# Patient Record
Sex: MALE | Race: BLACK OR AFRICAN AMERICAN | NOT HISPANIC OR LATINO | Employment: OTHER | URBAN - METROPOLITAN AREA
[De-identification: names, ages, dates, MRNs, and addresses within clinical notes are randomized per-mention and may not be internally consistent; named-entity substitution may affect disease eponyms.]

---

## 2021-01-07 ENCOUNTER — TELEPHONE (OUTPATIENT)
Dept: CARDIOLOGY | Facility: CLINIC | Age: 66
End: 2021-01-07

## 2021-02-01 ENCOUNTER — TELEPHONE (OUTPATIENT)
Dept: CARDIOLOGY | Facility: CLINIC | Age: 66
End: 2021-02-01

## 2021-10-11 RX ORDER — SOTALOL HYDROCHLORIDE 80 MG/1
TABLET ORAL
Qty: 135 TABLET | Refills: 1 | Status: SHIPPED | OUTPATIENT
Start: 2021-10-11 | End: 2022-11-03 | Stop reason: SDUPTHER

## 2021-10-29 ENCOUNTER — OFFICE VISIT (OUTPATIENT)
Dept: CARDIOLOGY | Facility: CLINIC | Age: 66
End: 2021-10-29
Payer: MEDICARE

## 2021-10-29 VITALS
SYSTOLIC BLOOD PRESSURE: 104 MMHG | HEART RATE: 60 BPM | DIASTOLIC BLOOD PRESSURE: 60 MMHG | WEIGHT: 134 LBS | RESPIRATION RATE: 16 BRPM | HEIGHT: 67 IN | OXYGEN SATURATION: 98 % | BODY MASS INDEX: 21.03 KG/M2

## 2021-10-29 DIAGNOSIS — I25.82 CHRONIC TOTAL OCCLUSION OF NATIVE CORONARY ARTERY: ICD-10-CM

## 2021-10-29 DIAGNOSIS — I61.9 HEMORRHAGIC CEREBROVASCULAR ACCIDENT (CVA): ICD-10-CM

## 2021-10-29 DIAGNOSIS — F17.200 CURRENT SMOKER: ICD-10-CM

## 2021-10-29 DIAGNOSIS — I10 ESSENTIAL HYPERTENSION: ICD-10-CM

## 2021-10-29 DIAGNOSIS — E78.2 MIXED HYPERLIPIDEMIA: ICD-10-CM

## 2021-10-29 DIAGNOSIS — I25.10 CHRONIC TOTAL OCCLUSION OF NATIVE CORONARY ARTERY: ICD-10-CM

## 2021-10-29 DIAGNOSIS — R94.31 NONSPECIFIC ST-T WAVE ELECTROCARDIOGRAPHIC CHANGES: Primary | ICD-10-CM

## 2021-10-29 PROCEDURE — 99214 OFFICE O/P EST MOD 30 MIN: CPT | Mod: S$GLB,,, | Performed by: INTERNAL MEDICINE

## 2021-10-29 PROCEDURE — 93000 EKG 12-LEAD: ICD-10-PCS | Mod: S$GLB,,, | Performed by: INTERNAL MEDICINE

## 2021-10-29 PROCEDURE — 99214 PR OFFICE/OUTPT VISIT, EST, LEVL IV, 30-39 MIN: ICD-10-PCS | Mod: S$GLB,,, | Performed by: INTERNAL MEDICINE

## 2021-10-29 PROCEDURE — 93000 ELECTROCARDIOGRAM COMPLETE: CPT | Mod: S$GLB,,, | Performed by: INTERNAL MEDICINE

## 2021-10-29 RX ORDER — AMLODIPINE BESYLATE 5 MG/1
5 TABLET ORAL 2 TIMES DAILY
COMMUNITY
Start: 2021-10-05 | End: 2022-05-31 | Stop reason: SDUPTHER

## 2021-10-29 RX ORDER — FLUTICASONE PROPIONATE 50 MCG
1 SPRAY, SUSPENSION (ML) NASAL DAILY
COMMUNITY
Start: 2021-07-01

## 2021-10-29 RX ORDER — FAMOTIDINE 20 MG/1
20 TABLET, FILM COATED ORAL 2 TIMES DAILY
COMMUNITY
Start: 2021-07-01 | End: 2023-01-20

## 2021-10-29 RX ORDER — ALBUTEROL SULFATE 90 UG/1
AEROSOL, METERED RESPIRATORY (INHALATION)
COMMUNITY
Start: 2021-10-23

## 2022-05-31 RX ORDER — AMLODIPINE BESYLATE 5 MG/1
5 TABLET ORAL 2 TIMES DAILY
Qty: 90 TABLET | Refills: 3 | Status: SHIPPED | OUTPATIENT
Start: 2022-05-31 | End: 2023-04-21 | Stop reason: SDUPTHER

## 2022-05-31 NOTE — TELEPHONE ENCOUNTER
----- Message from Ananda Moore sent at 5/31/2022  1:40 PM CDT -----  Type:  RX Refill Request  Who Called: Patient  Refill or New Rx: refill  RX Name and Strength: amLODIPine (NORVASC) 5 MG tablet  How is the patient currently taking it? (ex. 1XDay):    Is this a 30 day or 90 day RX:    Preferred Pharmacy with phone number:  [facility], [place] - [address] [facility]   Phone:  [phone]  Fax:  [phone]  Local or Mail Order:  Local  Ordering Provider:    Best Call Back Number:  [phone]  Additional Information: Pt requesting refill on Rx amLODIPine (NORVASC) 5 MG tablet

## 2022-11-03 RX ORDER — SOTALOL HYDROCHLORIDE 80 MG/1
TABLET ORAL
Qty: 135 TABLET | Refills: 1 | Status: SHIPPED | OUTPATIENT
Start: 2022-11-03 | End: 2022-11-04 | Stop reason: SDUPTHER

## 2022-11-04 RX ORDER — SOTALOL HYDROCHLORIDE 80 MG/1
TABLET ORAL
Qty: 135 TABLET | Refills: 1 | Status: SHIPPED | OUTPATIENT
Start: 2022-11-04 | End: 2024-02-08

## 2022-11-04 NOTE — TELEPHONE ENCOUNTER
----- Message from Alireza Byrnes sent at 11/4/2022 10:21 AM CDT -----  Type:  RX Refill Request    Who Called:  pt  Refill or New Rx:  refill  RX Name and Strength:  sotaloL (BETAPACE) 80 MG tablet  How is the patient currently taking it? (ex. 1XDay):  as directed  Is this a 30 day or 90 day RX:  90  Preferred Pharmacy with phone number:    [facility], [place] - [id number] [facility] [place] [address]  Phone: [phone] Fax: [phone]      Local or Mail Order:  local  Ordering Provider:  Fabio Smith Call Back Number:  [phone]    Additional Information:  please call and advise--thank you

## 2022-12-27 ENCOUNTER — TELEPHONE (OUTPATIENT)
Dept: CARDIOLOGY | Facility: CLINIC | Age: 67
End: 2022-12-27
Payer: COMMERCIAL

## 2023-01-06 ENCOUNTER — TELEPHONE (OUTPATIENT)
Dept: CARDIOLOGY | Facility: CLINIC | Age: 68
End: 2023-01-06
Payer: COMMERCIAL

## 2023-01-06 NOTE — TELEPHONE ENCOUNTER
----- Message from Alireza Byrnes sent at 1/6/2023  8:48 AM CST -----  Type: Needs Medical Advice  Who Called:  pt sister, Luciana  Symptoms (please be specific):  said she wanted to know if she can get her brother appt the same time as her--please call her--please call and advise  Best Call Back Number: [phone]  Additional Information: thank you

## 2023-01-10 ENCOUNTER — TELEPHONE (OUTPATIENT)
Dept: CARDIOLOGY | Facility: CLINIC | Age: 68
End: 2023-01-10
Payer: COMMERCIAL

## 2023-01-10 NOTE — TELEPHONE ENCOUNTER
----- Message from Ananda Moore sent at 1/10/2023  8:16 AM CST -----  Contact: Luciana  Type: Needs Medical Advice  Who Called: Pt sister Luciana  Symptoms (please be specific):   How long has patient had these symptoms:    Pharmacy name and phone #:    Best Call Back Number: [phone]  Additional Information: Pt sister requesting a call back to see if pt can be moved back to his original appt date which was 01/13/23.Attempt to r/s but date not available.

## 2023-01-20 ENCOUNTER — OFFICE VISIT (OUTPATIENT)
Dept: CARDIOLOGY | Facility: CLINIC | Age: 68
End: 2023-01-20
Payer: MEDICARE

## 2023-01-20 VITALS
BODY MASS INDEX: 21.03 KG/M2 | SYSTOLIC BLOOD PRESSURE: 132 MMHG | OXYGEN SATURATION: 98 % | DIASTOLIC BLOOD PRESSURE: 68 MMHG | WEIGHT: 134 LBS | RESPIRATION RATE: 16 BRPM | HEART RATE: 72 BPM | HEIGHT: 67 IN

## 2023-01-20 DIAGNOSIS — I25.82 CHRONIC TOTAL OCCLUSION OF NATIVE CORONARY ARTERY: ICD-10-CM

## 2023-01-20 DIAGNOSIS — E78.2 MIXED HYPERLIPIDEMIA: ICD-10-CM

## 2023-01-20 DIAGNOSIS — I61.9 HEMORRHAGIC CEREBROVASCULAR ACCIDENT (CVA): ICD-10-CM

## 2023-01-20 DIAGNOSIS — R94.31 NONSPECIFIC ST-T WAVE ELECTROCARDIOGRAPHIC CHANGES: ICD-10-CM

## 2023-01-20 DIAGNOSIS — F17.200 CURRENT SMOKER: ICD-10-CM

## 2023-01-20 DIAGNOSIS — I10 ESSENTIAL HYPERTENSION: Primary | ICD-10-CM

## 2023-01-20 DIAGNOSIS — I25.10 CHRONIC TOTAL OCCLUSION OF NATIVE CORONARY ARTERY: ICD-10-CM

## 2023-01-20 PROCEDURE — 93000 ELECTROCARDIOGRAM COMPLETE: CPT | Mod: S$GLB,,, | Performed by: INTERNAL MEDICINE

## 2023-01-20 PROCEDURE — 99214 OFFICE O/P EST MOD 30 MIN: CPT | Mod: S$GLB,,, | Performed by: INTERNAL MEDICINE

## 2023-01-20 PROCEDURE — 99214 PR OFFICE/OUTPT VISIT, EST, LEVL IV, 30-39 MIN: ICD-10-PCS | Mod: S$GLB,,, | Performed by: INTERNAL MEDICINE

## 2023-01-20 PROCEDURE — 93000 EKG 12-LEAD: ICD-10-PCS | Mod: S$GLB,,, | Performed by: INTERNAL MEDICINE

## 2023-01-20 RX ORDER — BUDESONIDE AND FORMOTEROL FUMARATE DIHYDRATE 160; 4.5 UG/1; UG/1
AEROSOL RESPIRATORY (INHALATION)
COMMUNITY
Start: 2023-01-09

## 2023-01-20 NOTE — PROGRESS NOTES
Subjective:    Patient ID:  Gonzalo Castrejon is a 67 y.o. male     Chief Complaint   Patient presents with    Shortness of Breath    Hypertension    Tachycardia       HPI:  Mr Gonzalo Castrejon is a 67 y.o. male is here for follow-up.    Patient has been doing well no specific complaints at the present time.  His breathing is stable at the present time he had a bout of coughing and wheezing and severe shortness of breath and difficulty in breathing which made him to stop smoking completely.  He completely give up smoking and he feels much better.  He still has some bronchitis and brings up sputum occasionally.    Denies any chest pain or tightness or heaviness denies any dizziness or lightheadedness denies any loss of consciousness falls or head injury.    He is taking all his medications regularly.        Review of patient's allergies indicates:  No Known Allergies    History reviewed. No pertinent past medical history.  History reviewed. No pertinent surgical history.  Social History     Tobacco Use    Smoking status: Every Day    Smokeless tobacco: Never   Substance Use Topics    Alcohol use: Not Currently     History reviewed. No pertinent family history.     Review of Systems:   Constitution: Negative for diaphoresis and fever.   HEENT: Negative for nosebleeds.    Cardiovascular: Negative for chest pain       Intermittent dyspnea on exertion       No leg swelling        No palpitations  Respiratory:  Positive for shortness of breath and wheezing.    Hematologic/Lymphatic: Negative for bleeding problem. Does not bruise/bleed easily.   Skin: Negative for color change and rash.   Musculoskeletal: Negative for falls and myalgias.   Gastrointestinal: Negative for hematemesis and hematochezia.   Genitourinary: Negative for hematuria.   Neurological: Negative for dizziness and light-headedness.   Psychiatric/Behavioral: Negative for altered mental status and memory loss.          Objective:        Vitals:    01/20/23 0849    BP: 132/68   Pulse: 72   Resp: 16       No results found for: WBC, HGB, HCT, PLT, CHOL, TRIG, HDL, LDLDIRECT, ALT, AST, NA, K, CL, CREATININE, BUN, CO2, TSH, PSA, INR, GLUF, HGBA1C, MICROALBUR     ECHOCARDIOGRAM RESULTS  Results for orders placed during the hospital encounter of 22    Echo    Interpretation Summary  · The left ventricle is normal in size with normal systolic function.  · The estimated ejection fraction is 60%.  · Normal left ventricular diastolic function.  · Normal right ventricular size with normal right ventricular systolic function.        CURRENT/PREVIOUS VISIT EKG  Results for orders placed or performed in visit on 10/29/21   IN OFFICE EKG 12-LEAD (to [facility])    Collection Time: 10/29/21  2:23 PM    Narrative    Test Reason : R94.31,    Vent. Rate : 060 BPM     Atrial Rate : 060 BPM     P-R Int : 164 ms          QRS Dur : 098 ms      QT Int : 432 ms       P-R-T Axes : 073 062 -43 degrees     QTc Int : 432 ms    Normal sinus rhythm  Moderate voltage criteria for LVH, may be normal variant  Cannot rule out Inferior infarct ,age undetermined  Abnormal ECG  No previous ECGs available  Confirmed by Fabio Kaur MD ([id number]) on 2021 12:50:41 PM    Referred By:             Confirmed By:Fabio Kaur MD     No valid procedures specified.   No results found for this or any previous visit.      Physical Exam:  CONSTITUTIONAL: No fever, no chills  HEENT: Normocephalic, atraumatic,pupils reactive to light                 NECK:  No JVD no carotid bruit  CVS: S1S2+, RRR, systolic murmurs,   LUNGS: Clear bilateral decreased breath sounds at the bases  ABDOMEN: Soft, NT, BS+  EXTREMITIES: No cyanosis, edema  : No abel catheter  NEURO: AAO X 3  PSY: Normal affect      Medication List with Changes/Refills   Current Medications    ALBUTEROL (PROVENTIL/VENTOLIN HFA) 90 MCG/ACTUATION INHALER    SMARTSI Inhalation Via Inhaler Every 4 Hours PRN    AMLODIPINE (NORVASC) 5 MG TABLET    Take 1 tablet (5  mg total) by mouth 2 (two) times daily.    BUDESONIDE-FORMOTEROL 160-4.5 MCG (SYMBICORT) 160-4.5 MCG/ACTUATION HFAA    Inhale into the lungs.    FLUTICASONE PROPIONATE (FLONASE) 50 MCG/ACTUATION NASAL SPRAY    1 spray by Each Nostril route once daily.    SOTALOL (BETAPACE) 80 MG TABLET    TAKE 1/2 TABLET 3 TIMES A DAY AS DIRECTED   Discontinued Medications    FAMOTIDINE (PEPCID) 20 MG TABLET    Take 20 mg by mouth 2 (two) times daily.             Assessment:       1. Essential hypertension    2. Hemorrhagic cerebrovascular accident (CVA)    3. Chronic total occlusion of native coronary artery    4. Mixed hyperlipidemia    5. Current smoker    6. Nonspecific ST-T wave electrocardiographic changes         Plan:   1. Essential hypertension  Patient's blood pressure is stable at 132/68 and he is taking his medications he is on amlodipine 5 mg p.o. b.i.d. continue the same.  2. Patient has chronic obstructive pulmonary disease  He is currently on albuterol and Symbicort it takes it on regular basis.  And the inhaler is working well for him.    3. 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  4. Paroxysmal atrial fibrillation   Is on sotalol 80 mg p.o. b.i.d. continue the same.  His QT intervals are QT is 408 and corrected QT interval is 446 milliseconds.  5. Hemorrhagic cerebrovascular accident   Patient is not on any specific medications or anticoagulants due to his CVA.    6. EKG  Reviewed his EKG independently patient is in normal sinus rhythm with a heart rate of 72 beats per minute minimal voltage criteria for left ventricle hypertrophy in nonspecific ST T-wave changes.  Can not rule out inferior infarct age undetermined.  No significant change from the previous EKG.    7. Patient to continue current management.  Patient had echocardiogram done on 03/03/2022 and it showed normal LV function with left ventricle ejection fraction of 60% normal diastolic function normal right ventricle  systolic function.     8. I will see him back in the office in 6 months time.            Problem List Items Addressed This Visit          Neuro    Hemorrhagic cerebrovascular accident (CVA)       Cardiac/Vascular    Mixed hyperlipidemia    Essential hypertension - Primary    Chronic total occlusion of native coronary artery    Nonspecific ST-T wave electrocardiographic changes       Other    Current smoker       No follow-ups on file.

## 2023-04-21 RX ORDER — AMLODIPINE BESYLATE 5 MG/1
5 TABLET ORAL 2 TIMES DAILY
Qty: 90 TABLET | Refills: 3 | Status: SHIPPED | OUTPATIENT
Start: 2023-04-21 | End: 2023-11-13

## 2023-04-21 NOTE — TELEPHONE ENCOUNTER
----- Message from Ayanna Cara sent at 4/21/2023  9:56 AM CDT -----  Contact: PT  Type:  RX Refill Request    Who Called:  Gonzalo/ PT  Refill or New Rx: refill  RX Name and Strength:  amLODIPine (NORVASC) 5 MG tablet  How is the patient currently taking it? Take 1 tablet (5 mg total) by mouth 2 (two) times daily  Is this a 30 day or 90 day RX: 90  Preferred Pharmacy with phone number:    [place], [place] - [address] [address]  Phone: [phone] Fax: [phone]    Best Call Back Number:  [phone]  Additional Information: PT out of Medication,  asking to please send Rx to pharmacy. Would like to  prescription

## 2023-11-16 ENCOUNTER — OFFICE VISIT (OUTPATIENT)
Dept: CARDIOLOGY | Facility: CLINIC | Age: 68
End: 2023-11-16
Payer: MEDICARE

## 2023-11-16 VITALS
SYSTOLIC BLOOD PRESSURE: 128 MMHG | WEIGHT: 147 LBS | OXYGEN SATURATION: 98 % | HEIGHT: 67 IN | DIASTOLIC BLOOD PRESSURE: 80 MMHG | RESPIRATION RATE: 16 BRPM | BODY MASS INDEX: 23.07 KG/M2

## 2023-11-16 DIAGNOSIS — I25.82 CHRONIC TOTAL OCCLUSION OF NATIVE CORONARY ARTERY: ICD-10-CM

## 2023-11-16 DIAGNOSIS — I61.9 HEMORRHAGIC CEREBROVASCULAR ACCIDENT (CVA): Primary | ICD-10-CM

## 2023-11-16 DIAGNOSIS — I10 ESSENTIAL HYPERTENSION: ICD-10-CM

## 2023-11-16 DIAGNOSIS — I48.0 PAROXYSMAL ATRIAL FIBRILLATION: ICD-10-CM

## 2023-11-16 DIAGNOSIS — E78.2 MIXED HYPERLIPIDEMIA: ICD-10-CM

## 2023-11-16 DIAGNOSIS — F17.200 CURRENT SMOKER: ICD-10-CM

## 2023-11-16 DIAGNOSIS — R94.31 NONSPECIFIC ST-T WAVE ELECTROCARDIOGRAPHIC CHANGES: ICD-10-CM

## 2023-11-16 DIAGNOSIS — I25.10 CHRONIC TOTAL OCCLUSION OF NATIVE CORONARY ARTERY: ICD-10-CM

## 2023-11-16 PROCEDURE — 93010 EKG 12-LEAD: ICD-10-PCS | Mod: S$PBB,,, | Performed by: INTERNAL MEDICINE

## 2023-11-16 PROCEDURE — 99213 OFFICE O/P EST LOW 20 MIN: CPT | Mod: PBBFAC,PN | Performed by: INTERNAL MEDICINE

## 2023-11-16 PROCEDURE — 99214 PR OFFICE/OUTPT VISIT, EST, LEVL IV, 30-39 MIN: ICD-10-PCS | Mod: S$PBB,,, | Performed by: INTERNAL MEDICINE

## 2023-11-16 PROCEDURE — 93005 ELECTROCARDIOGRAM TRACING: CPT | Mod: PBBFAC,PN | Performed by: INTERNAL MEDICINE

## 2023-11-16 PROCEDURE — 93010 ELECTROCARDIOGRAM REPORT: CPT | Mod: S$PBB,,, | Performed by: INTERNAL MEDICINE

## 2023-11-16 PROCEDURE — 99214 OFFICE O/P EST MOD 30 MIN: CPT | Mod: S$PBB,,, | Performed by: INTERNAL MEDICINE

## 2023-11-16 PROCEDURE — 99999 PR PBB SHADOW E&M-EST. PATIENT-LVL III: ICD-10-PCS | Mod: PBBFAC,,, | Performed by: INTERNAL MEDICINE

## 2023-11-16 PROCEDURE — 99999 PR PBB SHADOW E&M-EST. PATIENT-LVL III: CPT | Mod: PBBFAC,,, | Performed by: INTERNAL MEDICINE

## 2023-11-16 NOTE — PROGRESS NOTES
Subjective:    Patient ID:  Gonzalo Castrejon is a 67 y.o. male     Chief Complaint   Patient presents with    Hyperlipidemia    Hypertension       HPI:  Mr Gonzalo Castrejon is a 67 y.o. male is here for follow-up.    Patient has been doing well no specific complaints at the present time.  His breathing has been good denies any shortness a breath or difficulty in breathing denies any chest pain or tightness or heaviness denies any dizziness or lightheadedness or loss of consciousness.    He is taking all his medications regularly.    Patient is very active in spite of having a stroke he keeps walking in the hallways.      Review of patient's allergies indicates:  No Known Allergies    History reviewed. No pertinent past medical history.  History reviewed. No pertinent surgical history.  Social History     Tobacco Use    Smoking status: Every Day    Smokeless tobacco: Never   Substance Use Topics    Alcohol use: Not Currently     History reviewed. No pertinent family history.     Review of Systems:   Constitution: Negative for diaphoresis and fever.   HEENT: Negative for nosebleeds.    Cardiovascular: Negative for chest pain       No dyspnea on exertion       No leg swelling        No palpitations  Respiratory: Negative for shortness of breath and wheezing.    Hematologic/Lymphatic: Negative for bleeding problem. Does not bruise/bleed easily.   Skin: Negative for color change and rash.   Musculoskeletal: Negative for falls and myalgias.   Gastrointestinal: Negative for hematemesis and hematochezia.   Genitourinary: Negative for hematuria.   Neurological: Negative for dizziness and light-headedness.   Psychiatric/Behavioral: Negative for altered mental status and memory loss.          Objective:        Vitals:    11/16/23 1357   BP: 128/80   Pulse: (P) 64   Resp: 16       Lab Results   Component Value Date    CHOL 175 08/09/2023    TRIG 86 08/09/2023    HDL 46 08/09/2023        ECHOCARDIOGRAM RESULTS  Results for orders  placed during the hospital encounter of 22    Echo    Interpretation Summary  · The left ventricle is normal in size with normal systolic function.  · The estimated ejection fraction is 60%.  · Normal left ventricular diastolic function.  · Normal right ventricular size with normal right ventricular systolic function.        CURRENT/PREVIOUS VISIT EKG  Results for orders placed or performed in visit on 23   IN OFFICE EKG 12-LEAD (to [place])    Collection Time: 23  8:48 AM    Narrative    Test Reason : R94.31,    Vent. Rate : 072 BPM     Atrial Rate : 072 BPM     P-R Int : 162 ms          QRS Dur : 100 ms      QT Int : 408 ms       P-R-T Axes : 052 061 -26 degrees     QTc Int : 446 ms    Normal sinus rhythm  Minimal voltage criteria for LVH, may be normal variant  Cannot rule out Inferior infarct (cited on or before 29-OCT-2021)  Abnormal ECG  When compared with ECG of 29-OCT-2021 14:23,  Questionable change in initial forces of Inferior leads  Confirmed by Fabio Kaur MD ([id number]) on 2023 3:53:29 PM    Referred By:             Confirmed By:Fabio Kaur MD     No valid procedures specified.   No results found for this or any previous visit.      Physical Exam:  CONSTITUTIONAL: No fever, no chills  HEENT: Normocephalic, atraumatic,pupils reactive to light                 NECK:  No JVD no carotid bruit  CVS: S1S2+, RRR, systolic murmurs,   LUNGS: Clear  ABDOMEN: Soft, NT, BS+  EXTREMITIES: No cyanosis, edema  : No abel catheter  NEURO: AAO X 3 left-sided hemiplegia  PSY: Normal affect      Medication List with Changes/Refills   Current Medications    ALBUTEROL (PROVENTIL/VENTOLIN HFA) 90 MCG/ACTUATION INHALER    SMARTSI Inhalation Via Inhaler Every 4 Hours PRN    AMLODIPINE (NORVASC) 5 MG TABLET    TAKE ONE TABLET BY MOUTH TWICE DAILY FOR BLOOD PRESSURE    BUDESONIDE-FORMOTEROL 160-4.5 MCG (SYMBICORT) 160-4.5 MCG/ACTUATION HFAA    Inhale into the lungs.    FLUTICASONE PROPIONATE (FLONASE) 50  MCG/ACTUATION NASAL SPRAY    1 spray by Each Nostril route once daily.    SOTALOL (BETAPACE) 80 MG TABLET    TAKE 1/2 TABLET 3 TIMES A DAY AS DIRECTED             Assessment:       1. Hemorrhagic cerebrovascular accident (CVA)    2. Essential hypertension    3. Mixed hyperlipidemia    4. Chronic total occlusion of native coronary artery    5. Current smoker    6. Nonspecific ST-T wave electrocardiographic changes    7. Paroxysmal atrial fibrillation         Plan:   1. Hemorrhagic CVA   Patient has left-sided hemiplegia and has been doing well he still ambulates very well and he does ambulate and walk at his place quite often and regularly.  Continue to do the same.  2. Essential hypertension   His blood pressure is 128/80 and he is currently on amlodipine 5 mg p.o. b.i.d. continue the same.  His blood pressure is adequately controlled.    3. Paroxysmal AFib.    Patient is currently in sinus rhythm.  And he is not on any anticoagulation due to his CVA.  He is on sotalol 80 mg p.o. b.i.d. continue the same.  And his QT interval is 458 and corrected QT interval is 472 milliseconds.  Essentially within therapeutic limits.  4. Mixed hyperlipidemia   He is currently not on any specific statin therapy.  On his last blood work his total cholesterol is 175 HDL is 46 and triglycerides 86 continue to do the same.  5. CAD  He has chronic total occlusion of the RCA and is stable he gets from left-to-right collaterals.  6. Current smoker   Patient has decreased his smoking amount.  He now only smokes very minimal amount.  Discussed with patient to probably cut down and stop his smoking completely.  7. EKG   Reviewed his EKG independently patient is in normal sinus rhythm with a heart rate of 64 beats per minute and normal intervals and nonspecific ST T-wave changes in the inferior lateral leads.  No significant change from the previous EKG.  8. Patient to continue current management and I will see him back in the office in 6  months time.            Problem List Items Addressed This Visit          Neuro    Hemorrhagic cerebrovascular accident (CVA) - Primary       Cardiac/Vascular    Mixed hyperlipidemia    Essential hypertension    Chronic total occlusion of native coronary artery    Nonspecific ST-T wave electrocardiographic changes    Relevant Orders    IN OFFICE EKG 12-LEAD (to [facility])       Other    Current smoker     Other Visit Diagnoses       Paroxysmal atrial fibrillation                No follow-ups on file.

## 2024-02-08 RX ORDER — SOTALOL HYDROCHLORIDE 80 MG/1
TABLET ORAL
Qty: 135 TABLET | Refills: 0 | Status: SHIPPED | OUTPATIENT
Start: 2024-02-08 | End: 2024-05-28

## 2024-05-24 DIAGNOSIS — I25.10 CHRONIC TOTAL OCCLUSION OF NATIVE CORONARY ARTERY: ICD-10-CM

## 2024-05-24 DIAGNOSIS — I25.82 CHRONIC TOTAL OCCLUSION OF NATIVE CORONARY ARTERY: ICD-10-CM

## 2024-05-24 DIAGNOSIS — I61.9 HEMORRHAGIC CEREBROVASCULAR ACCIDENT (CVA): Primary | ICD-10-CM

## 2024-05-24 NOTE — TELEPHONE ENCOUNTER
Pt last sen in office 11/16/23 v with ADITYA Kaur MD patient is requesting a refill for Betapace 80 Mg

## 2024-05-27 RX ORDER — AMLODIPINE BESYLATE 5 MG/1
5 TABLET ORAL 2 TIMES DAILY
Qty: 90 TABLET | Refills: 3 | Status: SHIPPED | OUTPATIENT
Start: 2024-05-27

## 2024-05-28 RX ORDER — SOTALOL HYDROCHLORIDE 80 MG/1
TABLET ORAL
Qty: 135 TABLET | Refills: 0 | Status: SHIPPED | OUTPATIENT
Start: 2024-05-28

## 2024-09-03 DIAGNOSIS — I25.10 CHRONIC TOTAL OCCLUSION OF NATIVE CORONARY ARTERY: ICD-10-CM

## 2024-09-03 DIAGNOSIS — I61.9 HEMORRHAGIC CEREBROVASCULAR ACCIDENT (CVA): ICD-10-CM

## 2024-09-03 DIAGNOSIS — I25.82 CHRONIC TOTAL OCCLUSION OF NATIVE CORONARY ARTERY: ICD-10-CM

## 2024-09-03 RX ORDER — SOTALOL HYDROCHLORIDE 80 MG/1
TABLET ORAL
Qty: 135 TABLET | Refills: 0 | Status: SHIPPED | OUTPATIENT
Start: 2024-09-03

## 2024-09-03 NOTE — TELEPHONE ENCOUNTER
Patient is due for an appointment.  Will send a 90 day supply but would like him to come into the office to see me.

## 2024-12-12 DIAGNOSIS — I25.82 CHRONIC TOTAL OCCLUSION OF NATIVE CORONARY ARTERY: ICD-10-CM

## 2024-12-12 DIAGNOSIS — I25.10 CHRONIC TOTAL OCCLUSION OF NATIVE CORONARY ARTERY: ICD-10-CM

## 2024-12-12 DIAGNOSIS — I10 ESSENTIAL HYPERTENSION: Primary | ICD-10-CM

## 2024-12-12 DIAGNOSIS — I61.9 HEMORRHAGIC CEREBROVASCULAR ACCIDENT (CVA): ICD-10-CM

## 2024-12-12 RX ORDER — SOTALOL HYDROCHLORIDE 80 MG/1
TABLET ORAL
Qty: 135 TABLET | Refills: 0 | OUTPATIENT
Start: 2024-12-12

## 2024-12-13 RX ORDER — AMLODIPINE BESYLATE 5 MG/1
5 TABLET ORAL 2 TIMES DAILY
Qty: 90 TABLET | Refills: 0 | Status: SHIPPED | OUTPATIENT
Start: 2024-12-13

## 2024-12-13 RX ORDER — SOTALOL HYDROCHLORIDE 80 MG/1
TABLET ORAL
Qty: 180 TABLET | Refills: 0 | Status: SHIPPED | OUTPATIENT
Start: 2024-12-13

## 2024-12-13 NOTE — TELEPHONE ENCOUNTER
----- Message from Kat sent at 12/13/2024  9:21 AM CST -----  Contact: Patient and sibling  Type:   Appointment Request      Name of Caller:Patient and sibling     When is the first available appointment?NA/ 2025    Symptoms:Annual Appt/ refills     Best Call Back Number:[phone]    Additional Information: Patient and sibling are requesting appts together for their annual visits. Please call to advise

## 2024-12-13 NOTE — TELEPHONE ENCOUNTER
----- Message from Kyung sent at 12/13/2024  2:57 PM CST -----  Contact: self  Type:  RX Refill Request    Who Called:  pt   Refill or New Rx:  refill  RX Name and Strength:  sotaloL (BETAPACE) 80 MG tablet, and amLODIPine (NORVASC) 5 MG tablet  How is the patient currently taking it? (ex. 1XDay):  as directed   Is this a 30 day or 90 day RX:  90  Preferred Pharmacy with phone number:    [place], [place] - [address] [address]  Phone: [phone] Fax: [phone]  Local or Mail Order:  local  Ordering Provider:  Dr Natasha Smith Call Back Number:  [phone]  Additional Information:  thanks